# Patient Record
(demographics unavailable — no encounter records)

---

## 2025-03-05 NOTE — DISCUSSION/SUMMARY
[FreeTextEntry1] : This is an 82-year-old female presenting with memory concerns, word-finding difficulties, and visual hallucinations. MoCA score of 16/30 suggests cognitive impairment. Given the patient's age, symptom presentation, and cognitive testing results, a neurodegenerative process such as AD is suspected. We discussed the role, expectation and potential side effects of cognitive enhancing medications. Will begin cognitive enhancing medication, obtain additional diagnostic information, and provide lifestyle recommendations to support cognitive health. - Plan : - Start donepezil at a low dose, gradually increasing as tolerated - Obtain routine aEEG - Request blood work results from primary care physician - Encourage lifestyle modifications: regular physical activity, social engagement, cognitive stimulation - Monitor blood pressure and follow up with primary care for hypertension management - Follow up in 2-3 months to assess medication response and consider adding memantine - Provide education on the importance of medication adherence and realistic expectations for treatment outcomes

## 2025-03-05 NOTE — HISTORY OF PRESENT ILLNESS
[FreeTextEntry1] : Ms. Riley is an 82 year-old woman with PMH HTN who presents today for evaluation of memory problems x about 1 year and states that symptoms began suddenly. She reports forgetting words, forgetting conversations, and misplacing items. Patient can perform activities of daily living independently but has handed over financial management to her . No reported issues with driving to familiar locations, cooking or preparing meals. She does not forget to turn off the stove after using it. Patient reports often seeing things, like children or people that aren't there, particularly in wooded areas. Sleep has improved recently, now sleeping about 10 hours/night. She has been more irritable recently, particularly towards her . Patient denies hx of seizures or seizure-like activity. Denies safety concerns or depressed mood. MRI brain recently performed demonstrated minimal small vessel ischemic disease. Generalized volume loss most pronounced in the right mesial temporal lobe.

## 2025-03-05 NOTE — PHYSICAL EXAM
[___ / 5] : Visuospatial / Executive: [unfilled] / 5 [0 / 0] : Memory: 0 / 0 [___ / 3] : Attention (Serial 7 subtraction): [unfilled] / 3 [___ / 1] : Fluency: [unfilled] / 1 [___ / 2] : Abstraction: [unfilled] / 2 [___ / 5] : Delayed Recall: [unfilled] / 5 [___ / 6] : Orientation: [unfilled] / 6 [FreeTextEntry1] : GENERAL PHYSICAL EXAM: GEN: no distress, normal affect EYES: sclera white, conjunctiva clear, no nystagmus CV: normal rhythm PULM: no respiratory distress, normal rhythm and effort EXT: no edema, no cyanosis SKIN: warm, dry, no rash or lesion on exposed skin   NEUROLOGICAL EXAM: Mental Status Orientation: alert and oriented to person, place, time, and situation Language: clear and fluent, intact comprehension and repetition   Cranial Nerves II: visual fields full to confrontation III, IV, VI: PERRL, EOMI V, VII: facial sensation and movement intact and symmetric VIII: hearing intact IX, X: uvula midline, soft palate elevates normally XI: BL shoulder shrug intact XII: tongue midline   Motor Shoulder abd: 5 (R), 5 (L) EF/EE: 5 (R), 5 (L) WF/WE: 5 (R), 5 (L) hand : 5 (R), 5 (L) HF/HE: 5 (R), 5 (L) KF/KE: 5 (R), 5 (L) DF/PF: 5 (R), 5 (L) Tone and bulk are normal in upper and lower limbs No pronator drift   Sensation Intact to light touch in all 4 EXTs   Reflex 2+ in BL biceps, brachioradialis, patella   Coordination Normal FTN bilaterally Able to perform rapid, alternating movements   Gait Normal stance, stride, and pivot turn Unable to perform Tandem walk Negative Romberg  [MocaTotal] : 16 unknown

## 2025-03-05 NOTE — PHYSICAL EXAM
[___ / 5] : Visuospatial / Executive: [unfilled] / 5 [0 / 0] : Memory: 0 / 0 [___ / 3] : Attention (Serial 7 subtraction): [unfilled] / 3 [___ / 1] : Fluency: [unfilled] / 1 [___ / 2] : Abstraction: [unfilled] / 2 [___ / 5] : Delayed Recall: [unfilled] / 5 [___ / 6] : Orientation: [unfilled] / 6 [FreeTextEntry1] : GENERAL PHYSICAL EXAM: GEN: no distress, normal affect EYES: sclera white, conjunctiva clear, no nystagmus CV: normal rhythm PULM: no respiratory distress, normal rhythm and effort EXT: no edema, no cyanosis SKIN: warm, dry, no rash or lesion on exposed skin   NEUROLOGICAL EXAM: Mental Status Orientation: alert and oriented to person, place, time, and situation Language: clear and fluent, intact comprehension and repetition   Cranial Nerves II: visual fields full to confrontation III, IV, VI: PERRL, EOMI V, VII: facial sensation and movement intact and symmetric VIII: hearing intact IX, X: uvula midline, soft palate elevates normally XI: BL shoulder shrug intact XII: tongue midline   Motor Shoulder abd: 5 (R), 5 (L) EF/EE: 5 (R), 5 (L) WF/WE: 5 (R), 5 (L) hand : 5 (R), 5 (L) HF/HE: 5 (R), 5 (L) KF/KE: 5 (R), 5 (L) DF/PF: 5 (R), 5 (L) Tone and bulk are normal in upper and lower limbs No pronator drift   Sensation Intact to light touch in all 4 EXTs   Reflex 2+ in BL biceps, brachioradialis, patella   Coordination Normal FTN bilaterally Able to perform rapid, alternating movements   Gait Normal stance, stride, and pivot turn Unable to perform Tandem walk Negative Romberg  [MocaTotal] : 16

## 2025-03-24 NOTE — PHYSICAL EXAM
[1+] : left foot dorsalis pedis 1+ [Oriented To Time, Place, And Person] : oriented to person, place, and time [Ankle Swelling (On Exam)] : not present [Varicose Veins Of Lower Extremities] : not present [] : not present [de-identified] : Contracted toes with overlapping second toe.   [FreeTextEntry1] : Painful hyperkeratotic lesion at the distal aspect of the left fourth toe

## 2025-03-24 NOTE — HISTORY OF PRESENT ILLNESS
[FreeTextEntry1] : PROSPER  is a 82 year old female seen in the office for initial visit Patient is present for evaluation and treatment of left foot 4th toe. Patient was referred by Dr Webster  Patient states her toe is painful whenever she puts pressure on it. She also has foot deformity where her toes are overlapping, denies open wounds.

## 2025-03-24 NOTE — ASSESSMENT
[FreeTextEntry1] : Discussed etiology and treatment options for the painful hammertoe.  With failure of conservative care using different shoes regular debridements of the callus she is elected to have the discussed flexor tenotomy. Risks of the procedure were discussed with the patient including but not limited to transfer chronic pain need for additional surgery Consent was signed. The left plantar fourth toe was blocked with 1% plain lidocaine The toe was prepped with Betadine A percutaneous approach was used and an 18-gauge needle was used to cut the flexor tendon The contracture was reduced and the toe was splinted in the corrected position Post procedure instructions were given She will follow-up in 2 to 3 weeks

## 2025-04-10 NOTE — DISCUSSION/SUMMARY
[FreeTextEntry1] : This is an 82-year-old female presenting for follow-up of severe cognitive enhancement, dementia. I recommend she abstain from driving due to cognitive deficits. Recommend driving evaluation to be performed if she would like to continue driving. Will continue donepezil and plan to add memantine. I again discussed the goals, expectations and potential side effects of these medications.  - Plan : - driving evaluation - continue donepezil 5 mg x 1 month, then increase to 10 mg QHS - begin memantine two weeks after increasing donepezil dose, begin as directed. Instructions were written out and discussed with her son in detail.  - Encourage lifestyle modifications: regular physical activity, social engagement, cognitive stimulation - Follow up in 2-3 months - Provide education on the importance of medication adherence and realistic expectations for treatment outcomes

## 2025-04-10 NOTE — HISTORY OF PRESENT ILLNESS
[FreeTextEntry1] : INTERIM HISTORY: Presents today for follow-up. She is accompanied by her son who provides collateral history. Symptoms remain stable. Didn't start donepezil immediately after last visit but began it about a week ago. She is tolerating medication thus far. Symptoms remain generally stable. Son notes she has periods of irritability and paranoia (believing her  took her wallet, thinking she shouldn't drive). She is still driving but family believes she should no longer drive. This is a source of argument between her and her .   INITIAL OV 3/4/25: Ms. Riley is an 82 year-old woman with PMH HTN who presents today for evaluation of memory problems x about 1 year and states that symptoms began suddenly. She reports forgetting words, forgetting conversations, and misplacing items. Patient can perform activities of daily living independently but has handed over financial management to her . No reported issues with driving to familiar locations, cooking or preparing meals. She does not forget to turn off the stove after using it. Patient reports often seeing things, like children or people that aren't there, particularly in wooded areas. Sleep has improved recently, now sleeping about 10 hours/night. She has been more irritable recently, particularly towards her . Patient denies hx of seizures or seizure-like activity. Denies safety concerns or depressed mood. MRI brain recently performed demonstrated minimal small vessel ischemic disease. Generalized volume loss most pronounced in the right mesial temporal lobe.

## 2025-04-28 NOTE — PHYSICAL EXAM
[1+] : left foot dorsalis pedis 1+ [Oriented To Time, Place, And Person] : oriented to person, place, and time [General Appearance - Alert] : alert [General Appearance - In No Acute Distress] : in no acute distress [Ankle Swelling (On Exam)] : not present [Varicose Veins Of Lower Extremities] : not present [] : not present [FreeTextEntry3] :  Patient has weak 1/4 dorsalis pedis and 1/4 posterior tibial artery pulses.  Increased temperature gradient and decreased capillary refill time bilaterally.  No acute ischemic changes noted. [de-identified] : Contracted toes with overlapping second toe.   [FreeTextEntry1] : Painful hyperkeratotic lesion at the distal aspect of the left fourth toe  Toenails are thickened, dystrophic, discolored yellow, painful, elongated and with subungual debris 1,2,3,4,5 bilaterally.

## 2025-04-28 NOTE — HISTORY OF PRESENT ILLNESS
[FreeTextEntry1] : PROSPER is an 82-year-old female seen in the office for initial visit Patient is present for evaluation and treatment of left foot 4th toe. Patient was referred by Dr Webster  Patient states her toe is painful whenever she puts pressure on it. She also has foot deformity where her toes are overlapping, denies open wounds.

## 2025-04-28 NOTE — ASSESSMENT
[FreeTextEntry1] : Discussed etiology and treatment options for the painful hammertoe.  With failure of conservative care using different shoes regular debridements of the callus she is elected to have the discussed flexor tenotomy. Risks of the procedure were discussed with the patient including but not limited to transfer chronic pain need for additional surgery Debrided fungal toenails 1-10 The toe was prepped with Betadine Consider revision of the left 4th flexor tenotomy  Referred to an orthopaedic shoe store  She will follow-up in 4 weeks

## 2025-05-27 NOTE — ASSESSMENT
[FreeTextEntry1] : Discussed etiology and treatment options for the painful hammertoe.  With failure of conservative care using different shoes regular debridements of the callus she is elected to have the discussed flexor tenotomy. Risks of the procedure were discussed with the patient including but not limited to transfer chronic pain need for additional surgery The toe was prepped with Betadine Consent obtained for left 4th flexor tenotomy.  Under local anesthesia with 1% plain lidocaine, a percutaneous flexor tenotomy was performed using an 18-gauge needle.  The left fourth toe contracture was reduced.  It was splinted in the corrected position with Betadine and a dry sterile dressing.  Postprocedure instructions were given.  Referred to an orthopaedic shoe store  Foot care next visit as well She will follow-up in 4 weeks

## 2025-05-27 NOTE — PHYSICAL EXAM
[General Appearance - Alert] : alert [General Appearance - In No Acute Distress] : in no acute distress [1+] : left foot dorsalis pedis 1+ [Oriented To Time, Place, And Person] : oriented to person, place, and time [Skin Color & Pigmentation] : normal skin color and pigmentation [Sensation] : the sensory exam was normal to light touch and pinprick [Ankle Swelling (On Exam)] : not present [Varicose Veins Of Lower Extremities] : not present [] : not present [FreeTextEntry3] :  Patient has weak 1/4 dorsalis pedis and 1/4 posterior tibial artery pulses.  Increased temperature gradient and decreased capillary refill time bilaterally.  No acute ischemic changes noted. [de-identified] : Contracted toes with overlapping second toe.   [FreeTextEntry1] : Painful hyperkeratotic lesion at the distal aspect of the left fourth toe with subcutaneous bleeds  Toenails are thickened, dystrophic, discolored yellow, painful, elongated and with subungual debris 1,2,3,4,5 bilaterally.

## 2025-06-24 NOTE — ASSESSMENT
[FreeTextEntry1] : Discussed etiology and treatment options for the painful hammertoe.  With failure of   flexor tenotomy an arthroplasty was discussed  Educated patient about peripheral arterial disease.  Discussed importance of walking and exercise to promote collateral circulation.  Recommended screening with a noninvasive vascular study (Pulse Volume Recording / Ankle Brachial Index). Consider vascular surgery consultation.  Referred to an orthopaedic shoe store  Debrided fungal toenails 1-10 She will follow-up in 4 weeks

## 2025-06-24 NOTE — HISTORY OF PRESENT ILLNESS
[FreeTextEntry1] : PROSPER is an 82-year-old female seen in the office for initial visit Patient is present for evaluation and treatment of left foot 4th toe. Patient was referred by Dr Webster  Patient states her toe is painful whenever she puts pressure on it. She also has foot deformity where her toes are overlapping, denies open wounds. Patient would like toenail care & foot exam

## 2025-06-24 NOTE — PHYSICAL EXAM
[General Appearance - Alert] : alert [General Appearance - In No Acute Distress] : in no acute distress [1+] : left foot dorsalis pedis 1+ [Skin Color & Pigmentation] : normal skin color and pigmentation [Sensation] : the sensory exam was normal to light touch and pinprick [Oriented To Time, Place, And Person] : oriented to person, place, and time [Ankle Swelling (On Exam)] : not present [Varicose Veins Of Lower Extremities] : not present [] : not present [FreeTextEntry3] :  Patient has weak 1/4 dorsalis pedis and 1/4 posterior tibial artery pulses.  Increased temperature gradient and decreased capillary refill time bilaterally.  No acute ischemic changes noted. [de-identified] : Contracted toes with overlapping second toe.   [FreeTextEntry1] : Painful hyperkeratotic lesion at the distal aspect of the left fourth toe with subcutaneous bleeds  Toenails are thickened, dystrophic, discolored yellow, painful, elongated and with subungual debris 1,2,3,4,5 bilaterally.

## 2025-07-10 NOTE — DISCUSSION/SUMMARY
[FreeTextEntry1] : This is an 83-year-old female presenting for follow-up of severe cognitive enhancement, dementia.  Symptoms remain stable, despite consistent use of medication.  We discussed the role, expectations, and potential side effects of cognitive enhancing medications.  She agrees to begin donepezil 5 mg nightly.  Is compliant, will increase dose to 10 mg and add memantine as previously discussed.  We again discussed safety precautions.  Follow-up with me in 3-4 months, or sooner should the need arise.  All of their questions and concerns were addressed.

## 2025-07-10 NOTE — HISTORY OF PRESENT ILLNESS
[FreeTextEntry1] : INTERIM HISTORY: Symptoms remain stable.  Patient took donepezil for about 2 weeks, prior to discontinuing medication.  Denies  experiencing side effects while she was taking the medication.  Denies any new, concerning neurological symptoms.  INITIAL OV 3/4/25: Ms. Riley is an 82 year-old woman with PMH HTN who presents today for evaluation of memory problems x about 1 year and states that symptoms began suddenly. She reports forgetting words, forgetting conversations, and misplacing items. Patient can perform activities of daily living independently but has handed over financial management to her . No reported issues with driving to familiar locations, cooking or preparing meals. She does not forget to turn off the stove after using it. Patient reports often seeing things, like children or people that aren't there, particularly in wooded areas. Sleep has improved recently, now sleeping about 10 hours/night. She has been more irritable recently, particularly towards her . Patient denies hx of seizures or seizure-like activity. Denies safety concerns or depressed mood. MRI brain recently performed demonstrated minimal small vessel ischemic disease. Generalized volume loss most pronounced in the right mesial temporal lobe.  4/9/25: Presents today for follow-up. She is accompanied by her son who provides collateral history. Symptoms remain stable. Didn't start donepezil immediately after last visit but began it about a week ago. She is tolerating medication thus far. Symptoms remain generally stable. Son notes she has periods of irritability and paranoia (believing her  took her wallet, thinking she shouldn't drive). She is still driving but family believes she should no longer drive. This is a source of argument between her and her .